# Patient Record
Sex: MALE | Race: WHITE | NOT HISPANIC OR LATINO | Employment: UNEMPLOYED | URBAN - NONMETROPOLITAN AREA
[De-identification: names, ages, dates, MRNs, and addresses within clinical notes are randomized per-mention and may not be internally consistent; named-entity substitution may affect disease eponyms.]

---

## 2022-09-02 ENCOUNTER — OFFICE VISIT (OUTPATIENT)
Dept: FAMILY MEDICINE CLINIC | Facility: CLINIC | Age: 22
End: 2022-09-02

## 2022-09-02 VITALS
HEART RATE: 90 BPM | HEIGHT: 66 IN | RESPIRATION RATE: 15 BRPM | OXYGEN SATURATION: 99 % | WEIGHT: 126 LBS | SYSTOLIC BLOOD PRESSURE: 110 MMHG | DIASTOLIC BLOOD PRESSURE: 76 MMHG | TEMPERATURE: 97.1 F | BODY MASS INDEX: 20.25 KG/M2

## 2022-09-02 DIAGNOSIS — R53.83 FATIGUE, UNSPECIFIED TYPE: ICD-10-CM

## 2022-09-02 DIAGNOSIS — Z11.59 ENCOUNTER FOR HEPATITIS C SCREENING TEST FOR LOW RISK PATIENT: ICD-10-CM

## 2022-09-02 DIAGNOSIS — L70.0 ACNE VULGARIS: ICD-10-CM

## 2022-09-02 DIAGNOSIS — I88.9 LYMPHADENITIS: Primary | ICD-10-CM

## 2022-09-02 PROBLEM — L70.9 ACNE: Status: ACTIVE | Noted: 2017-07-07

## 2022-09-02 PROCEDURE — 99203 OFFICE O/P NEW LOW 30 MIN: CPT | Performed by: FAMILY MEDICINE

## 2022-09-02 RX ORDER — CLINDAMYCIN PHOSPHATE 10 MG/G
1 GEL TOPICAL 2 TIMES DAILY
Qty: 60 G | Refills: 2 | Status: SHIPPED | OUTPATIENT
Start: 2022-09-02

## 2022-09-02 RX ORDER — DOXYCYCLINE HYCLATE 100 MG/1
100 CAPSULE ORAL 2 TIMES DAILY
Qty: 20 CAPSULE | Refills: 0 | Status: SHIPPED | OUTPATIENT
Start: 2022-09-02

## 2022-09-02 NOTE — PROGRESS NOTES
New Patient Office Visit      Patient Name: Blanco Sultana  : 2000   MRN: [id number]     Chief Complaint:    Chief Complaint   Patient presents with   • Establish Care   • lump in groin     +1 mo       History of Present Illness: Blanco Sultana is a 21 y.o. male who is here today to establish care and has had a little nodule at the base of the penis/scrotal area he is worried about.  He has a long history of acne and has had some other lymph nodes on his neck and overlying the mastoid process on the right that he has noticed for a long time 2.  I guess he been googling and is worried about the lesion in his groin.  But he says it is gone down now and is smaller.    He has had no weight loss has had a maybe little fatigue at times.  No high risk sexual behaviors.    Subjective      Review of Systems:   Review of Systems   Constitutional: Positive for fatigue. Negative for fever.   Respiratory: Negative for cough and shortness of breath.    Cardiovascular: Negative for chest pain and palpitations.   Skin: Negative for rash.       Past Medical History: History reviewed. No pertinent past medical history.    Past Surgical History: History reviewed. No pertinent surgical history.    Family History: History reviewed. No pertinent family history.    Social History:   Social History     Socioeconomic History   • Marital status: Single   Tobacco Use   • Smoking status: Never Smoker   • Smokeless tobacco: Never Used   Vaping Use   • Vaping Use: Never used   Substance and Sexual Activity   • Alcohol use: Never   • Drug use: Never   • Sexual activity: Defer       Medications:     Current Outpatient Medications:   •  clindamycin (Clindagel) 1 % gel, Apply 1 application topically to the appropriate area as directed 2 (Two) Times a Day., Disp: 60 g, Rfl: 2  •  doxycycline (VIBRAMYCIN) 100 MG capsule, Take 1 capsule by mouth 2 (Two) Times a Day., Disp: 20 capsule, Rfl: 0    Allergies:   No Known  "Allergies    Objective     Physical Exam:  Vital Signs:   Vitals:    09/02/22 1342   BP: 110/76   BP Location: Left arm   Patient Position: Sitting   Cuff Size: Adult   Pulse: 90   Resp: 15   Temp: 97.1 °F (36.2 °C)   TempSrc: Infrared   SpO2: 99%   Weight: 57.2 kg (126 lb)   Height: 167.6 cm (66\")   PainSc: 0-No pain     Body mass index is 20.34 kg/m².   BMI is within normal parameters. No other follow-up for BMI required.       Physical Exam  Vitals and nursing note reviewed.   Constitutional:       Appearance: Normal appearance.      Comments: Quiet, thin white male in no acute distress   HENT:      Head: Normocephalic and atraumatic.      Right Ear: Tympanic membrane and ear canal normal.      Left Ear: Tympanic membrane and ear canal normal.      Mouth/Throat:      Mouth: Mucous membranes are moist.      Pharynx: Oropharynx is clear. No posterior oropharyngeal erythema.   Neck:      Comments: He does have a few shotty lymph nodes overlying the right mastoid process.  And some acne scattered and then a supraclavicular node on the right as well about 1 cm soft and mobile  Cardiovascular:      Rate and Rhythm: Normal rate and regular rhythm.   Pulmonary:      Effort: Pulmonary effort is normal.      Breath sounds: Normal breath sounds.   Abdominal:      General: Abdomen is flat. Bowel sounds are normal.      Palpations: Abdomen is soft. There is no mass.      Hernia: No hernia is present.   Genitourinary:     Penis: Normal.       Testes: Normal.      Comments: He has a small cyst versus acne type cyst just at the left groin base of penis/scrotum area  And a number of shotty lymph nodes in the groin palpable nontender.  Scattered acne in the  and suprapubic area and groin.  Musculoskeletal:         General: Normal range of motion.      Cervical back: Normal range of motion and neck supple. No rigidity or tenderness.      Right lower leg: No edema.      Left lower leg: No edema.   Lymphadenopathy:      Cervical: " Cervical adenopathy present.   Skin:     General: Skin is warm and dry.      Comments: His back is covered with old acne scars less so new acne.  He has some current mild red papules and pustules on the face and groin suprapubic area where he is shaved down in the  area.   Neurological:      General: No focal deficit present.      Mental Status: He is alert.   Psychiatric:         Mood and Affect: Mood normal.         Behavior: Behavior normal.         Procedures    PHQ-9 Total Score: 0     Assessment / Plan      Assessment/Plan:   Diagnoses and all orders for this visit:    1. Lymphadenitis (Primary)  -     doxycycline (VIBRAMYCIN) 100 MG capsule; Take 1 capsule by mouth 2 (Two) Times a Day.  Dispense: 20 capsule; Refill: 0  -     CBC Auto Differential; Future    2. Acne vulgaris  -     doxycycline (VIBRAMYCIN) 100 MG capsule; Take 1 capsule by mouth 2 (Two) Times a Day.  Dispense: 20 capsule; Refill: 0  -     clindamycin (Clindagel) 1 % gel; Apply 1 application topically to the appropriate area as directed 2 (Two) Times a Day.  Dispense: 60 g; Refill: 2    3. Encounter for hepatitis C screening test for low risk patient  -     Hepatitis C Antibody; Future    4. Fatigue, unspecified type  -     CBC Auto Differential; Future  -     Comprehensive Metabolic Panel; Future         We will go ahead and get some baseline labs as requested CBC CMP and his hep C screen.    Will cover with doxycycline as he does have mild diffuse acne which is mild now but at 1 point I can tell was pretty severe on his back due to all the scarring.    I suspect that small cyst in the left groin he is concerned about his benign had told him if grows or changes or becomes painful to get back in here and we can get him in to see urology to get a biopsy.  But it is fairly soft and mobile and is about pea-sized.  On the left    We will treat with clindamycin the individual acne spots when they arise as directed    Follow-up in 1 month for  physical and to go over everything.      Follow Up:   Return in about 1 month (around 10/2/2022) for Annual physical.        Maurice Hemphill MD  [facility]   Portions of note created with Dragon voice recognition technology

## 2024-05-20 ENCOUNTER — TELEPHONE (OUTPATIENT)
Dept: FAMILY MEDICINE CLINIC | Facility: CLINIC | Age: 24
End: 2024-05-20
Payer: MEDICAID

## 2024-05-20 NOTE — TELEPHONE ENCOUNTER
Mom calling about patient wanting to see Dr. Witt for HRT.  He has a PCP in [place].  Ok to schedule with Dr. DWYER even though he has a

## 2024-05-21 NOTE — TELEPHONE ENCOUNTER
Error     Patient with recurrent vesicles on right forehead that drain honey colored fluid. Has been treated with Valtrex, Bactrim, and hydrocortisone cream without relief. Seems like an impetigo. Will try Bactroban. Follow up with primary care if not helping.

## 2024-08-19 ENCOUNTER — OFFICE VISIT (OUTPATIENT)
Dept: FAMILY MEDICINE CLINIC | Facility: CLINIC | Age: 24
End: 2024-08-19
Payer: MEDICAID

## 2024-08-19 VITALS
HEART RATE: 81 BPM | WEIGHT: 135.1 LBS | SYSTOLIC BLOOD PRESSURE: 116 MMHG | BODY MASS INDEX: 21.71 KG/M2 | RESPIRATION RATE: 16 BRPM | DIASTOLIC BLOOD PRESSURE: 90 MMHG | OXYGEN SATURATION: 99 % | HEIGHT: 66 IN

## 2024-08-19 DIAGNOSIS — F64.9 GENDER DYSPHORIA: Primary | ICD-10-CM

## 2024-08-19 DIAGNOSIS — E34.9 HORMONE IMBALANCE: ICD-10-CM

## 2024-08-19 PROCEDURE — 1126F AMNT PAIN NOTED NONE PRSNT: CPT | Performed by: FAMILY MEDICINE

## 2024-08-19 PROCEDURE — 1159F MED LIST DOCD IN RCRD: CPT | Performed by: FAMILY MEDICINE

## 2024-08-19 PROCEDURE — 1160F RVW MEDS BY RX/DR IN RCRD: CPT | Performed by: FAMILY MEDICINE

## 2024-08-19 PROCEDURE — 99214 OFFICE O/P EST MOD 30 MIN: CPT | Performed by: FAMILY MEDICINE

## 2024-08-19 RX ORDER — ESTRADIOL VALERATE 20 MG/ML
2 INJECTION INTRAMUSCULAR 2 TIMES WEEKLY
Qty: 5 ML | Refills: 3 | Status: SHIPPED | OUTPATIENT
Start: 2024-08-19

## 2024-08-19 NOTE — PROGRESS NOTES
"Chief Complaint  Gender dysphoria in adult    Subjective    History of Present Illness    Blanco Sultana presents to [facility] for Gender dysphoria in adult.  History of Present Illness     Here today to establish care and discuss starting gender-affirming hormone therapy. Has been questioning gender for a while now, felt affirmed enough in it to come out to mom as transfemale over a year ago. Mom had questions at first, but is now overall supportive. Brother is also supportive. Has been to several doctors offices to inquire about gender-affirming hormone therapy but has been turned down every time.     Very interested in starting gender-affirming hormone therapy. Has done quite a bit of reading on the subject, feels that he knows what to expect in general. Still using \"he/him\" pronouns until he's seen enough changes to feel more feminine.     Objective     Vital Signs:   /90   Pulse 81   Resp 16   Ht 167.6 cm (66\")   Wt 61.3 kg (135 lb 1.6 oz)   SpO2 99%   BMI 21.81 kg/m²   Physical Exam  Vitals and nursing note reviewed.   Constitutional:       General: He is not in acute distress.     Appearance: Normal appearance. He is not ill-appearing.   Cardiovascular:      Rate and Rhythm: Normal rate and regular rhythm.      Pulses: Normal pulses.      Heart sounds: Normal heart sounds. No murmur heard.  Pulmonary:      Effort: Pulmonary effort is normal. No respiratory distress.      Breath sounds: Normal breath sounds. No rales.   Neurological:      Mental Status: He is alert and oriented to person, place, and time. Mental status is at baseline.   Psychiatric:         Mood and Affect: Mood normal.         Behavior: Behavior normal.                 Assessment and Plan   Diagnoses and all orders for this visit:    1. Gender dysphoria (Primary)  -     estradiol valerate (DELESTROGEN) 20 MG/ML injection; Inject 0.1 mL into the appropriate muscle as directed by prescriber 2 (Two) " Times a Week.  Dispense: 5 mL; Refill: 3    2. Hormone imbalance  -     estradiol valerate (DELESTROGEN) 20 MG/ML injection; Inject 0.1 mL into the appropriate muscle as directed by prescriber 2 (Two) Times a Week.  Dispense: 5 mL; Refill: 3    Established gender dysphoria, hormonal imbalance in a transgender patient with a strong desire for gender affirming hormone therapy.     Gender identity is consistent, persistent, and insistent. Discussed risks, benefits, alternatives, potential side effects of therapy, and typical follow up. Resources provided including [facility] Standards of Care, PFLAG “Our Trans Loved Ones”, and local support groups.     Meds as above. Gave detailed instructions as to proper subcutaneous injection technique and demonstrated the same. Given sample injection supplies to last until they are able to obtain their own supply. Encouraged to sign up for and use [employer].    >50% of this 30 min visit spent in counseling about gender-affirming hormone therapy.     Recommended follow up as below. Encouraged communication via [employer] in the meantime.     Patient was given instructions and counseling regarding his condition or for health maintenance advice. Please see specific information pulled into the AVS (placed there by myself) if appropriate.    Return in about 2 months (around 10/19/2024), or if symptoms worsen or fail to improve, for f/u gender-affirming hormone therapy.    ALMA Witt MD

## 2024-08-19 NOTE — PATIENT INSTRUCTIONS
"Transfemme Resources    [facility] Transgender Support Group - Facebook group and Discord     [employer] for Transgender Health, Standards of Care  https://www.wpath.org/publications/soc - pages 38 and 40 especially     [facility], Transgender Health - [email or web address]      [facility] - [email or web address]    [facility] - [email or web address]     Parents, Families, and Friends of Lesbians and [place] - https://www.pflag.org/[email or web address]  - good document for friends/family who need to learn the basics     [facility] [place] - https://transequality.org/documents  - helps with name change, gender marker change, etc, is state specific     * * * * * *    Insurance will typically cover hormones, but if they don't, or if you need to pay out of pocket, use the following site.    www.CardiOx     Can get injection supplies online - [employer] [place]. Recommended supplies as below:     1 ml, Luer-Bianca Syringes      22G 1\" hypodermic needles for drawing up      27G 1/2\" hypodermic needles for injecting      [facility] Injection Guide -   https://[employer].org/wp-content/uploads/MG-6_TransHealth_InjectionGuide.pdf     Injection supplies:  Syringes   Needles   Alcohol swabs  Cotton balls/band-aids  Sharps container       "

## 2024-10-07 ENCOUNTER — TELEMEDICINE (OUTPATIENT)
Dept: FAMILY MEDICINE CLINIC | Facility: CLINIC | Age: 24
End: 2024-10-07
Payer: MEDICAID

## 2024-10-07 DIAGNOSIS — E34.9 HORMONE IMBALANCE: ICD-10-CM

## 2024-10-07 DIAGNOSIS — F64.9 GENDER DYSPHORIA: Primary | ICD-10-CM

## 2024-10-07 DIAGNOSIS — Z51.81 THERAPEUTIC DRUG MONITORING: ICD-10-CM

## 2024-10-07 PROCEDURE — 1160F RVW MEDS BY RX/DR IN RCRD: CPT | Performed by: FAMILY MEDICINE

## 2024-10-07 PROCEDURE — 1159F MED LIST DOCD IN RCRD: CPT | Performed by: FAMILY MEDICINE

## 2024-10-07 PROCEDURE — 1126F AMNT PAIN NOTED NONE PRSNT: CPT | Performed by: FAMILY MEDICINE

## 2024-10-07 PROCEDURE — 99213 OFFICE O/P EST LOW 20 MIN: CPT | Performed by: FAMILY MEDICINE

## 2024-10-07 NOTE — PROGRESS NOTES
Mode of Visit: Video  Location of patient: home  You have chosen to receive care through a telehealth visit.  Does the patient consent to use a video/audio connection for your medical care today? Yes  The visit included audio and video interaction. No technical issues occurred during this visit.     Chief Complaint  gender affirming hormone therapy    Subjective    History of Present Illness    Blanco Sultana presents to [facility] for gender affirming hormone therapy.  History of Present Illness     Visit today for follow-up as above. Has been doing well with her regimen since our last visit. No problems with self administration, no injection site reactions. Reports good adherence and tolerance overall, no unwanted side effects. Due for check of labs, would like to get them checked in [place] if possible. No need for refill at this time.    Objective     Vital Signs:   There were no vitals taken for this visit.  Physical Exam  Constitutional:       General: He is not in acute distress.     Appearance: Normal appearance. He is not ill-appearing.   Eyes:      Extraocular Movements: Extraocular movements intact.      Pupils: Pupils are equal, round, and reactive to light.   Pulmonary:      Effort: Pulmonary effort is normal.   Musculoskeletal:      Cervical back: Normal range of motion.   Neurological:      Mental Status: He is alert and oriented to person, place, and time. Mental status is at baseline.   Psychiatric:         Mood and Affect: Mood normal.         Behavior: Behavior normal.                 Assessment and Plan   Diagnoses and all orders for this visit:    1. Gender dysphoria (Primary)  -     Estradiol  -     Estrone  -     Testosterone  -     Comprehensive Metabolic Panel  -     Lipid Panel    2. Hormone imbalance  -     Estradiol  -     Estrone  -     Testosterone  -     Comprehensive Metabolic Panel  -     Lipid Panel    3. Therapeutic drug monitoring  -      Estradiol  -     Estrone  -     Testosterone  -     Comprehensive Metabolic Panel  -     Lipid Panel    As above. I will contact her with results as available. Continue regimen as prescribed.    Recommended follow up as below. Encouraged communication via [employer] in the meantime.     Patient was given instructions and counseling regarding his condition or for health maintenance advice. Please see specific information pulled into the AVS (placed there by myself) if appropriate.    Return in about 3 months (around 1/7/2025), or if symptoms worsen or fail to improve, for f/u gender-affirming hormone therapy.    ALMA Witt MD

## 2024-11-30 DIAGNOSIS — E34.9 HORMONE IMBALANCE: ICD-10-CM

## 2024-11-30 DIAGNOSIS — F64.9 GENDER DYSPHORIA: ICD-10-CM

## 2024-12-02 RX ORDER — ESTRADIOL VALERATE 20 MG/ML
2 INJECTION INTRAMUSCULAR 2 TIMES WEEKLY
Qty: 5 ML | Refills: 3 | Status: SHIPPED | OUTPATIENT
Start: 2024-12-02

## 2025-01-01 LAB
ALBUMIN SERPL-MCNC: 4.7 G/DL (ref 4.3–5.2)
ALP SERPL-CCNC: 101 IU/L (ref 44–121)
ALT SERPL-CCNC: 17 IU/L (ref 0–44)
AST SERPL-CCNC: 16 IU/L (ref 0–40)
BILIRUB SERPL-MCNC: 0.3 MG/DL (ref 0–1.2)
BUN SERPL-MCNC: 9 MG/DL (ref 6–20)
BUN/CREAT SERPL: 10 (ref 9–20)
CALCIUM SERPL-MCNC: 9.7 MG/DL (ref 8.7–10.2)
CHLORIDE SERPL-SCNC: 102 MMOL/L (ref 96–106)
CHOLEST SERPL-MCNC: 147 MG/DL (ref 100–199)
CO2 SERPL-SCNC: 22 MMOL/L (ref 20–29)
CREAT SERPL-MCNC: 0.9 MG/DL (ref 0.76–1.27)
EGFRCR SERPLBLD CKD-EPI 2021: 123 ML/MIN/1.73
ESTRADIOL SERPL-MCNC: 50 PG/ML (ref 7.6–42.6)
GLOBULIN SER CALC-MCNC: 2.4 G/DL (ref 1.5–4.5)
GLUCOSE SERPL-MCNC: 88 MG/DL (ref 70–99)
HDLC SERPL-MCNC: 57 MG/DL
LDLC SERPL CALC-MCNC: 78 MG/DL (ref 0–99)
POTASSIUM SERPL-SCNC: 3.8 MMOL/L (ref 3.5–5.2)
PROT SERPL-MCNC: 7.1 G/DL (ref 6–8.5)
SODIUM SERPL-SCNC: 139 MMOL/L (ref 134–144)
TESTOST SERPL-MCNC: 625 NG/DL (ref 264–916)
TRIGL SERPL-MCNC: 60 MG/DL (ref 0–149)
VLDLC SERPL CALC-MCNC: 12 MG/DL (ref 5–40)

## 2025-01-04 LAB — ESTRONE SERPL-MCNC: 94 PG/ML (ref 0–174)

## 2025-01-05 DIAGNOSIS — F64.9 GENDER DYSPHORIA: ICD-10-CM

## 2025-01-05 DIAGNOSIS — E34.9 HORMONE IMBALANCE: ICD-10-CM

## 2025-01-05 RX ORDER — ESTRADIOL VALERATE 20 MG/ML
2 INJECTION INTRAMUSCULAR 2 TIMES WEEKLY
Qty: 15 ML | Refills: 3 | Status: SHIPPED | OUTPATIENT
Start: 2025-01-06

## 2025-03-01 DIAGNOSIS — F64.9 GENDER DYSPHORIA: Primary | ICD-10-CM

## 2025-03-01 DIAGNOSIS — E34.9 HORMONE IMBALANCE: ICD-10-CM

## 2025-03-01 RX ORDER — NEEDLES, DISPOSABLE 25GX5/8"
1 NEEDLE, DISPOSABLE MISCELLANEOUS 2 TIMES WEEKLY
Qty: 50 EACH | Refills: 2 | Status: SHIPPED | OUTPATIENT
Start: 2025-03-03

## 2025-03-01 RX ORDER — SYRINGE, DISPOSABLE, 1 ML
1 SYRINGE, EMPTY DISPOSABLE MISCELLANEOUS 2 TIMES WEEKLY
Qty: 50 EACH | Refills: 2 | Status: SHIPPED | OUTPATIENT
Start: 2025-03-03 | End: 2026-02-26

## 2025-05-14 DIAGNOSIS — Z51.81 THERAPEUTIC DRUG MONITORING: ICD-10-CM

## 2025-05-14 DIAGNOSIS — F64.9 GENDER DYSPHORIA: Primary | ICD-10-CM

## 2025-05-14 DIAGNOSIS — E34.9 HORMONE IMBALANCE: ICD-10-CM

## 2025-05-17 LAB
ALBUMIN SERPL-MCNC: 4.8 G/DL (ref 4.3–5.2)
ALP SERPL-CCNC: 108 IU/L (ref 44–121)
ALT SERPL-CCNC: 10 IU/L (ref 0–44)
AST SERPL-CCNC: 15 IU/L (ref 0–40)
BILIRUB SERPL-MCNC: 0.6 MG/DL (ref 0–1.2)
BUN SERPL-MCNC: 7 MG/DL (ref 6–20)
BUN/CREAT SERPL: 8 (ref 9–20)
CALCIUM SERPL-MCNC: 9.8 MG/DL (ref 8.7–10.2)
CHLORIDE SERPL-SCNC: 102 MMOL/L (ref 96–106)
CHOLEST SERPL-MCNC: 142 MG/DL (ref 100–199)
CO2 SERPL-SCNC: 22 MMOL/L (ref 20–29)
CREAT SERPL-MCNC: 0.83 MG/DL (ref 0.76–1.27)
EGFRCR SERPLBLD CKD-EPI 2021: 126 ML/MIN/1.73
ESTRADIOL SERPL-MCNC: 88.9 PG/ML (ref 7.6–42.6)
GLOBULIN SER CALC-MCNC: 2.5 G/DL (ref 1.5–4.5)
GLUCOSE SERPL-MCNC: 86 MG/DL (ref 70–99)
HDLC SERPL-MCNC: 50 MG/DL
LDLC SERPL CALC-MCNC: 78 MG/DL (ref 0–99)
POTASSIUM SERPL-SCNC: 3.7 MMOL/L (ref 3.5–5.2)
PROT SERPL-MCNC: 7.3 G/DL (ref 6–8.5)
SODIUM SERPL-SCNC: 138 MMOL/L (ref 134–144)
TESTOST SERPL-MCNC: 147 NG/DL (ref 264–916)
TRIGL SERPL-MCNC: 72 MG/DL (ref 0–149)
VLDLC SERPL CALC-MCNC: 14 MG/DL (ref 5–40)

## 2025-05-19 DIAGNOSIS — E34.9 HORMONE IMBALANCE: ICD-10-CM

## 2025-05-19 DIAGNOSIS — F64.9 GENDER DYSPHORIA: ICD-10-CM

## 2025-05-19 RX ORDER — ESTRADIOL VALERATE 20 MG/ML
4 INJECTION INTRAMUSCULAR 2 TIMES WEEKLY
Qty: 15 ML | Refills: 3 | Status: SHIPPED | OUTPATIENT
Start: 2025-05-19

## 2025-05-20 LAB — ESTRONE SERPL-MCNC: 93 PG/ML (ref 0–174)

## 2025-06-18 DIAGNOSIS — F64.9 GENDER DYSPHORIA: ICD-10-CM

## 2025-06-18 DIAGNOSIS — E34.9 HORMONE IMBALANCE: ICD-10-CM

## 2025-06-18 RX ORDER — ESTRADIOL VALERATE 20 MG/ML
4 INJECTION INTRAMUSCULAR 2 TIMES WEEKLY
Qty: 5 ML | Refills: 5 | Status: SHIPPED | OUTPATIENT
Start: 2025-06-19